# Patient Record
Sex: MALE | Race: WHITE | Employment: UNEMPLOYED | URBAN - METROPOLITAN AREA
[De-identification: names, ages, dates, MRNs, and addresses within clinical notes are randomized per-mention and may not be internally consistent; named-entity substitution may affect disease eponyms.]

---

## 2021-07-05 ENCOUNTER — APPOINTMENT (OUTPATIENT)
Dept: GENERAL RADIOLOGY | Age: 21
End: 2021-07-05
Attending: PEDIATRICS
Payer: COMMERCIAL

## 2021-07-05 ENCOUNTER — HOSPITAL ENCOUNTER (EMERGENCY)
Age: 21
Discharge: HOME | End: 2021-07-05
Attending: PEDIATRICS
Payer: COMMERCIAL

## 2021-07-05 VITALS
TEMPERATURE: 98.1 F | DIASTOLIC BLOOD PRESSURE: 72 MMHG | OXYGEN SATURATION: 100 % | RESPIRATION RATE: 16 BRPM | HEART RATE: 77 BPM | WEIGHT: 149.47 LBS | SYSTOLIC BLOOD PRESSURE: 124 MMHG

## 2021-07-05 DIAGNOSIS — M54.31 SCIATICA OF RIGHT SIDE: Primary | ICD-10-CM

## 2021-07-05 DIAGNOSIS — M54.41 ACUTE RIGHT-SIDED LOW BACK PAIN WITH RIGHT-SIDED SCIATICA: ICD-10-CM

## 2021-07-05 PROCEDURE — 99283 EMERGENCY DEPT VISIT LOW MDM: CPT

## 2021-07-05 PROCEDURE — 74011250637 HC RX REV CODE- 250/637: Performed by: PEDIATRICS

## 2021-07-05 PROCEDURE — 72110 X-RAY EXAM L-2 SPINE 4/>VWS: CPT

## 2021-07-05 RX ORDER — PREDNISONE 20 MG/1
60 TABLET ORAL DAILY
Qty: 9 TABLET | Refills: 0 | Status: SHIPPED | OUTPATIENT
Start: 2021-07-05 | End: 2021-07-08

## 2021-07-05 RX ORDER — IBUPROFEN 600 MG/1
600 TABLET ORAL
Status: COMPLETED | OUTPATIENT
Start: 2021-07-05 | End: 2021-07-05

## 2021-07-05 RX ORDER — IBUPROFEN 600 MG/1
600 TABLET ORAL
Qty: 40 TABLET | Refills: 0 | Status: SHIPPED | OUTPATIENT
Start: 2021-07-05

## 2021-07-05 RX ADMIN — IBUPROFEN 600 MG: 600 TABLET, FILM COATED ORAL at 11:26

## 2021-07-05 NOTE — ED NOTES
Patient education given on ibuprofen administration and the patient and patient's mother expresses understanding and acceptance of instructions.  Gladys Rodriguez 7/5/2021 11:29 AM

## 2021-07-05 NOTE — ED NOTES
Patient education given on use of ice pack on lower back to help relieve pain and the patient expresses understanding and acceptance of instructions.  Garnette Harada 7/5/2021 12:31 PM

## 2021-07-05 NOTE — ED TRIAGE NOTES
Pt c/o of a constant pressure in lower back at 6/10 that radiates down his left leg and causes intermittent numbness in his left foot. Pt states he was jet sking and tubing on Saturday and the pain began Saturday night. Motrin X1 day w/ slight improvement. Tried ice/heat. Pt denies any pain or injury during activity on Saturday.

## 2021-07-05 NOTE — DISCHARGE INSTRUCTIONS
You were seen in the emergency department with low back pain and sciatica after riding a jet ski. Your neurological examination was reassuring and you have no evidence of fracture or narrowing of the disc spaces on x-ray. As you have sciatica symptoms please take the prednisone 60 mg (3 pills) daily for 3 days and use the ibuprofen up to 4 times a day as needed for pain. Please follow-up within the week with orthopedics and return to the emergency department should you develop numbness in the groin, urinary retention, fecal incontinence, or erectile dysfunction as all of these can be signs and symptoms of a spinal cord injury. Thank you for allowing us to provide you with medical care today. We realize that you have many choices for your emergency care needs. We thank you for choosing [facility].  Please choose us in the future for any continued health care needs. We hope we addressed all of your medical concerns. We strive to provide excellent quality care in the Emergency Department. Anything less than excellent does not meet our expectations. The exam and treatment you received in the Emergency Department were for an emergent problem and are not intended as complete care. It is important that you follow up with a doctor, nurse practitioner, or  [id number] assistant for ongoing care. If your symptoms worsen or you do not improve as expected and you are unable to reach your usual health care provider, you should return to the Emergency Department. We are available 24 hours a day. Take this sheet with you when you go to your follow-up visit. If you have any problem arranging the follow-up visit, contact the Emergency Department immediately. Make an appointment your family doctor for follow up of this visit. Return to the ER if you are unable to be seen in a timely manner.

## 2021-07-05 NOTE — ED NOTES
Pt discharged home with parent/guardian. Pt acting age appropriately, respirations regular and unlabored, cap refill less than two seconds. Skin pink, dry and warm. Lungs clear bilaterally. No further complaints at this time. Parent/guardian verbalized understanding of discharge paperwork and has no further questions at this time. Education provided about continuation of care, follow up care with [facility] and medication administration. Parent/guardian able to provided teach back about discharge instructions. Pt left ambulatory with mom from ED.

## 2021-07-05 NOTE — ED PROVIDER NOTES
HPI otherwise healthy 81-year-old male presents with nontraumatic low back pain. Notes that since Saturday evening he has had back pain in the right paravertebral muscle area that radiates down his leg and he has associated numbness in his foot feeling of his foot is falling asleep. Has not been sick in any way no direct trauma. Patient denies urinary retention, fecal incontinence, or erectile dysfunction. History reviewed. No pertinent past medical history. History reviewed. No pertinent surgical history. No family history on file. Social History     Socioeconomic History    Marital status: SINGLE     Spouse name: Not on file    Number of children: Not on file    Years of education: Not on file    Highest education level: Not on file   Occupational History    Not on file   Tobacco Use    Smoking status: Not on file   Substance and Sexual Activity    Alcohol use: Not on file    Drug use: Not on file    Sexual activity: Not on file   Other Topics Concern    Not on file   Social History Narrative    Not on file     Social Determinants of Health     Financial Resource Strain:     Difficulty of Paying Living Expenses:    Food Insecurity:     Worried About Running Out of Food in the Last Year:     [address] in the Last Year:    Transportation Needs:     Lack of Transportation (Medical):      Lack of Transportation (Non-Medical):    Physical Activity:     Days of Exercise per Week:     Minutes of Exercise per Session:    Stress:     Feeling of Stress :    Social Connections:     Frequency of Communication with Friends and Family:     Frequency of Social Gatherings with Friends and Family:     Attends Taoist Services:     Active Member of Clubs or Organizations:     Attends Club or Organization Meetings:     Marital Status:    Intimate Partner Violence:     Fear of Current or Ex-Partner:     Emotionally Abused:     Physically Abused:     Sexually Abused:    Medications: None  Immunizations: Up-to-date including first Covid shot  Social history: Patient does not smoke, drink, or use drugs. He is sexually active and uses condoms. ALLERGIES: Patient has no known allergies. Review of Systems   Constitutional: Negative for fever. HENT: Negative for congestion and rhinorrhea. Respiratory: Positive for cough. Gastrointestinal: Negative for diarrhea and vomiting. Musculoskeletal: Positive for back pain and myalgias. Negative for arthralgias. Neurological: Positive for numbness. Notes that while sitting he will have numbness in his right foot as if his foot is falling asleep and intermittent pain that radiates down the buttocks and down the right leg. All other systems reviewed and are negative. Vitals:    07/05/21 1106 07/05/21 1115   BP: 124/72    Pulse: 77    Resp: 16    Temp: 98.1 °F (36.7 °C)    SpO2: 100%    Weight:  67.8 kg (149 lb 7.6 oz)            Physical Exam  Constitutional:       General: He is not in acute distress. Appearance: Normal appearance. He is not ill-appearing. HENT:      Head: Normocephalic and atraumatic. Right Ear: External ear normal.      Nose: Nose normal.   Eyes:      Conjunctiva/sclera: Conjunctivae normal.   Neck:      Comments: Full range of motion of the neck, no midline cervical vertebral tenderness to palpation  Cardiovascular:      Rate and Rhythm: Normal rate and regular rhythm. Heart sounds: Normal heart sounds. No murmur heard. No friction rub. No gallop. Pulmonary:      Effort: Pulmonary effort is normal. No respiratory distress. Breath sounds: Normal breath sounds. No stridor. No wheezing, rhonchi or rales. Abdominal:      General: Abdomen is flat. There is no distension. Palpations: Abdomen is soft. Tenderness: There is no abdominal tenderness. Musculoskeletal:         General: Tenderness present. Normal range of motion. Cervical back: Neck supple.  No rigidity or tenderness. Comments: Mild tenderness to palpation in the right distal lumbar paravertebral region. No spinal tenderness to palpation. Positive straight leg test at 75 to 80 degrees on the left leg with pain at the right lower back. Negative straight leg raise on the right. Skin:     General: Skin is warm and dry. Neurological:      Mental Status: He is alert. Comments: Awake, alert, oriented, and appropriate. No clonus, cranial nerves II through XII grossly intact, 2+ patellar reflexes, no titubation, no dysmetria, no dysdiadochokinesis. Normal gait, normal tandem gait. No pronator drift, negative Romberg. Psychiatric:         Mood and Affect: Mood normal.          MDM  Number of Diagnoses or Management Options  Diagnosis management comments: 45-year-old athletic male with nontraumatic low back pain at the paravertebral region with signs and symptoms of sciatica. Treat discomfort with ibuprofen and obtain complete x-rays of the lumbar spine to evaluate for ankylosing spondylolysis, ankylosing spondylolisthesis, or other stress fracture. XR SPINE LUMB MIN 4 V   Final Result   1. No acute abnormality. 12:17 PM  Patient states he is feeling better after ibuprofen, I informed the family that the x-ray is negative for fracture and negative for narrowing of the disc spaces. As he has clinical sciatica symptoms there may be some impingement by discs which we cannot appreciate on x-ray. We will treat the patient with a 3-day burst of prednisone and referred to orthopedics for further evaluation, we will also discharge patient with ibuprofen 600 mg 4 times a day as needed for pain.        Procedures

## 2023-05-11 RX ORDER — IBUPROFEN 600 MG/1
TABLET ORAL EVERY 6 HOURS PRN
COMMUNITY
Start: 2021-07-05

## 2023-05-11 RX ORDER — HYDROCODONE BITARTRATE AND ACETAMINOPHEN 5; 325 MG/1; MG/1
1 TABLET ORAL EVERY 4 HOURS PRN
COMMUNITY
Start: 2013-05-07